# Patient Record
Sex: MALE | Race: BLACK OR AFRICAN AMERICAN | NOT HISPANIC OR LATINO | URBAN - METROPOLITAN AREA
[De-identification: names, ages, dates, MRNs, and addresses within clinical notes are randomized per-mention and may not be internally consistent; named-entity substitution may affect disease eponyms.]

---

## 2020-07-25 ENCOUNTER — TELEPHONE ENCOUNTER (OUTPATIENT)
Dept: URBAN - METROPOLITAN AREA CLINIC 13 | Facility: CLINIC | Age: 70
End: 2020-07-25

## 2020-07-25 RX ORDER — POLYETHYLENE GLYCOL 3350, SODIUM CHLORIDE, SODIUM BICARBONATE AND POTASSIUM CHLORIDE WITH LEMON FLAVOR 420; 11.2; 5.72; 1.48 G/4L; G/4L; G/4L; G/4L
TAKE HALF 5PM THE EVENING BEFORE THE PROCEDURE, AND TAKE THE OTHER HALF 6 HOURS BEFORE THE PROCEDURE POWDER, FOR SOLUTION ORAL
Qty: 1 | Refills: 0 | OUTPATIENT
Start: 2016-10-28 | End: 2016-12-01

## 2020-07-26 ENCOUNTER — TELEPHONE ENCOUNTER (OUTPATIENT)
Dept: URBAN - METROPOLITAN AREA CLINIC 13 | Facility: CLINIC | Age: 70
End: 2020-07-26

## 2021-10-06 ENCOUNTER — TELEPHONE ENCOUNTER (OUTPATIENT)
Dept: URBAN - METROPOLITAN AREA CLINIC 113 | Facility: CLINIC | Age: 71
End: 2021-10-06

## 2021-10-06 ENCOUNTER — LAB OUTSIDE AN ENCOUNTER (OUTPATIENT)
Dept: URBAN - METROPOLITAN AREA CLINIC 113 | Facility: CLINIC | Age: 71
End: 2021-10-06

## 2021-10-06 VITALS — WEIGHT: 148 LBS | BODY MASS INDEX: 23.78 KG/M2 | HEIGHT: 66 IN

## 2021-10-06 RX ORDER — SODIUM, POTASSIUM,MAG SULFATES 17.5-3.13G
354ML SOLUTION, RECONSTITUTED, ORAL ORAL
Qty: 354 MILLILITER | Refills: 0 | OUTPATIENT
Start: 2021-10-06 | End: 2021-10-07

## 2021-10-06 RX ORDER — LOSARTAN POTASSIUM 50 MG/1
1 TABLET TABLET ORAL ONCE A DAY
Status: ACTIVE | COMMUNITY

## 2021-12-13 ENCOUNTER — OFFICE VISIT (OUTPATIENT)
Dept: URBAN - METROPOLITAN AREA SURGERY CENTER 25 | Facility: SURGERY CENTER | Age: 71
End: 2021-12-13
Payer: MEDICARE

## 2021-12-13 DIAGNOSIS — Z86.010 ADENOMAS PERSONAL HISTORY OF COLONIC POLYPS: ICD-10-CM

## 2021-12-13 PROCEDURE — G0105 COLORECTAL SCRN; HI RISK IND: HCPCS | Performed by: INTERNAL MEDICINE

## 2021-12-13 PROCEDURE — G8907 PT DOC NO EVENTS ON DISCHARG: HCPCS | Performed by: INTERNAL MEDICINE

## 2022-01-14 ENCOUNTER — OFFICE VISIT (OUTPATIENT)
Dept: URBAN - METROPOLITAN AREA CLINIC 113 | Facility: CLINIC | Age: 72
End: 2022-01-14

## 2022-03-04 ENCOUNTER — OFFICE VISIT (OUTPATIENT)
Dept: URBAN - METROPOLITAN AREA CLINIC 113 | Facility: CLINIC | Age: 72
End: 2022-03-04

## 2022-03-23 ENCOUNTER — DASHBOARD ENCOUNTERS (OUTPATIENT)
Age: 72
End: 2022-03-23

## 2022-03-23 ENCOUNTER — OFFICE VISIT (OUTPATIENT)
Dept: URBAN - METROPOLITAN AREA CLINIC 113 | Facility: CLINIC | Age: 72
End: 2022-03-23
Payer: MEDICARE

## 2022-03-23 VITALS
TEMPERATURE: 98.4 F | WEIGHT: 146 LBS | DIASTOLIC BLOOD PRESSURE: 82 MMHG | RESPIRATION RATE: 20 BRPM | HEART RATE: 57 BPM | HEIGHT: 66 IN | BODY MASS INDEX: 23.46 KG/M2 | SYSTOLIC BLOOD PRESSURE: 158 MMHG

## 2022-03-23 DIAGNOSIS — K57.30 COLON, DIVERTICULOSIS: ICD-10-CM

## 2022-03-23 DIAGNOSIS — Z86.010 HISTORY OF ADENOMATOUS POLYP OF COLON: ICD-10-CM

## 2022-03-23 DIAGNOSIS — K64.8 INTERNAL HEMORRHOID: ICD-10-CM

## 2022-03-23 PROBLEM — 733657002: Status: ACTIVE | Noted: 2022-03-23

## 2022-03-23 PROBLEM — 90458007: Status: ACTIVE | Noted: 2022-03-23

## 2022-03-23 PROBLEM — 429047008: Status: ACTIVE | Noted: 2022-03-23

## 2022-03-23 PROCEDURE — 99213 OFFICE O/P EST LOW 20 MIN: CPT | Performed by: NURSE PRACTITIONER

## 2022-03-23 RX ORDER — LOSARTAN POTASSIUM 50 MG/1
1 TABLET TABLET ORAL ONCE A DAY
Status: ACTIVE | COMMUNITY

## 2022-03-23 NOTE — HPI-TODAY'S VISIT:
This is a 71-year-old male with a history of sleep apnea, heart murmur, chronic kidney disease stage III, hypertension, paroxysmal atrial fibrillation, and adenomatous colon polyps presenting for follow-up after a surveillance colonoscopy. He states he is doing well.  He started a multivitamin recently and experienced heartburn.  He stopped taking it and his symptoms resolved.  He is having regular bowel movements without red blood or melena.  He denies any abdominal symptoms.

## 2022-03-23 NOTE — HPI-OTHER HISTORIES
Colonoscopy 12/13/2021:BBPS 9 (Suprep), sigmoid/descending/transverse diverticulosis, moderate grade 1 nonbleeding internal hemorrhoids, otherwise normal to the terminal ileum.  Due to his prior history of adenomas, surveillance colonoscopy recommended in 2026.